# Patient Record
Sex: MALE | URBAN - METROPOLITAN AREA
[De-identification: names, ages, dates, MRNs, and addresses within clinical notes are randomized per-mention and may not be internally consistent; named-entity substitution may affect disease eponyms.]

---

## 2021-08-17 ENCOUNTER — OFFICE VISIT (OUTPATIENT)
Dept: URBAN - METROPOLITAN AREA CLINIC 75 | Facility: CLINIC | Age: 61
End: 2021-08-17
Payer: COMMERCIAL

## 2021-08-17 DIAGNOSIS — H35.371 PUCKERING OF MACULA, RIGHT EYE: ICD-10-CM

## 2021-08-17 DIAGNOSIS — H40.033 ANATOMICAL NARROW ANGLE, BILATERAL: Primary | ICD-10-CM

## 2021-08-17 DIAGNOSIS — H25.813 COMBINED FORMS OF AGE-RELATED CATARACT, BILATERAL: ICD-10-CM

## 2021-08-17 PROCEDURE — 92134 CPTRZ OPH DX IMG PST SGM RTA: CPT | Performed by: OPTOMETRIST

## 2021-08-17 PROCEDURE — 92133 CPTRZD OPH DX IMG PST SGM ON: CPT | Performed by: OPTOMETRIST

## 2021-08-17 PROCEDURE — 99204 OFFICE O/P NEW MOD 45 MIN: CPT | Performed by: OPTOMETRIST

## 2021-08-17 RX ORDER — LATANOPROST 50 UG/ML
0.005 % SOLUTION OPHTHALMIC
Qty: 7.5 | Refills: 3 | Status: INACTIVE
Start: 2021-08-17 | End: 2021-12-07

## 2021-08-17 ASSESSMENT — INTRAOCULAR PRESSURE
OS: 27
OD: 25

## 2021-08-17 NOTE — IMPRESSION/PLAN
Impression: Anatomical narrow angle, bilateral: H40.033. New DX 

OCT ordered and performed revealing RNLF cupping and elevated IOP. Angle-closure glaucoma, also known as narrow-angle glaucoma, is caused by blocked drainage canals in the eye, resulting in a sudden rise in intraocular pressure. Plan: Patient to begin Latanoprost QHS OU. Patient to IOP Target of 18.

## 2021-08-17 NOTE — IMPRESSION/PLAN
Impression: Puckering of macula, right eye: H35.371. Discussed epiretinal membranes do not usually require treatment, unless distorted vision or blurry vision occur. The main treatment consists of vitrectomy surgery with peeling off of the epiretinal membrane. Epiretinal membrane formation is often without symptoms or effect on vision. They occur from aging, previous eye trauma or surgery, or chronic eye inflammation (such as uveitis). In rare instances, they can progress and have a significant affect on vision. Plan: Discussed. Monitor. Pt to contact office w/ any loss or distortion of vision.

## 2021-08-17 NOTE — IMPRESSION/PLAN
Impression: Vitreous degeneration, bilateral: H43.813. New DX 

OCT ordered and performed revealing PVD OD causing slight pulling and tearing but stable at this time Posterior vitreous detachments are a normal aging change due to the vitreous jelly pulling away from the retinal lining of the eye. They may accompany retinal tears, retinal holes, or retinal detachments, so a dilated retinal examination is important. PVDs will usually diminish with time, but it may take several months and they rarely disappear entirely. Plan: Discussed. Monitor. Pt to contact office if symptoms worsen, including an increase in number of floaters, you experience flashing lights, loss of vision, or a black curtain blocking your field of vision.

## 2021-09-16 ENCOUNTER — OFFICE VISIT (OUTPATIENT)
Dept: URBAN - METROPOLITAN AREA CLINIC 75 | Facility: CLINIC | Age: 61
End: 2021-09-16
Payer: COMMERCIAL

## 2021-09-16 PROCEDURE — 99213 OFFICE O/P EST LOW 20 MIN: CPT | Performed by: OPTOMETRIST

## 2021-09-16 ASSESSMENT — INTRAOCULAR PRESSURE
OD: 20
OS: 22

## 2021-09-16 NOTE — IMPRESSION/PLAN
Impression: Combined forms of age-related cataract, bilateral: H25.813. Some specific cataracts like posterior subcapsular cataracts occur more commonly in diabetics, patients taking long term steroid medications, and following trauma. The appropriate time to perform cataract surgery is when the loss of vision is interfering with your activities of daily living and a change in eyeglass prescription won't help. Plan: Discussed cataracts do not require treatment unless they interfere with vision and impact one's activities of daily living, in which case cataract extraction with lens implant insertion should be performed. Pt declines any difficulty w/ vision at this time and elects to observe for now. No sx recommended. Discussed signs and symptoms of cataract progression. Pt to contact office if they experience progressive loss of vision, increasing glare, and problems with activities of daily living such as driving, reading, watching TV, seeing street signs, and following the golf ball.

## 2021-09-16 NOTE — IMPRESSION/PLAN
Impression: Anatomical narrow angle, bilateral: H40.033. IOP has dropped about %25 but still high normal OD and high OS. Plan: Discussed. Patient to continue Latanoprost QHS OU. If IOP does not continue to drop second morning GTTs to be considered. Monitor.

## 2021-12-07 ENCOUNTER — OFFICE VISIT (OUTPATIENT)
Dept: URBAN - METROPOLITAN AREA CLINIC 75 | Facility: CLINIC | Age: 61
End: 2021-12-07
Payer: COMMERCIAL

## 2021-12-07 DIAGNOSIS — H40.1122 PRIMARY OPEN-ANGLE GLAUCOMA, LEFT EYE, MODERATE STAGE: ICD-10-CM

## 2021-12-07 PROCEDURE — 92014 COMPRE OPH EXAM EST PT 1/>: CPT | Performed by: OPTOMETRIST

## 2021-12-07 PROCEDURE — 92083 EXTENDED VISUAL FIELD XM: CPT | Performed by: OPTOMETRIST

## 2021-12-07 RX ORDER — LATANOPROST 50 UG/ML
0.005 % SOLUTION OPHTHALMIC
Qty: 7.5 | Refills: 3 | Status: ACTIVE
Start: 2021-12-07

## 2021-12-07 ASSESSMENT — INTRAOCULAR PRESSURE
OD: 19
OS: 20

## 2021-12-07 NOTE — IMPRESSION/PLAN
Impression: Vitreous degeneration, bilateral: H43.813. Plan: Also known as posterior vitreous detachments are a normal aging change due to the vitreous jelly pulling away from the retinal lining of the eye. They may accompany retinal tears, retinal holes, or retinal detachments, so a dilated retinal examination is important. PVDs will usually diminish with time, but it may take several months and they rarely disappear entirely. Pt to contact office if symptoms worsen, including an increase in number of floaters, you experience flashing lights, loss of vision, or a black curtain blocking your field of vision.

## 2021-12-07 NOTE — IMPRESSION/PLAN
Impression: Primary open-angle glaucoma, severe stage, right eye: H40.1113- New findings on the on VF and clinical exam. 
IOP is elevated. Plan: *Will have pt see his PCP Dr. Justino Sotelo for full carotid artery work up and blood work. * (URGENT) PCP should order a sleep study as well. Will have pt back for full repeat exam.
Continue Latanoprost QPM OU. Will evaluate if an SLT is necessary at next visit.

## 2022-03-09 ENCOUNTER — OFFICE VISIT (OUTPATIENT)
Dept: URBAN - METROPOLITAN AREA CLINIC 75 | Facility: CLINIC | Age: 62
End: 2022-03-09
Payer: COMMERCIAL

## 2022-03-09 DIAGNOSIS — H40.1113 PRIMARY OPEN-ANGLE GLAUCOMA, RIGHT EYE, SEVERE STAGE: Primary | ICD-10-CM

## 2022-03-09 DIAGNOSIS — H43.813 VITREOUS DEGENERATION, BILATERAL: ICD-10-CM

## 2022-03-09 DIAGNOSIS — H25.13 AGE-RELATED NUCLEAR CATARACT, BILATERAL: ICD-10-CM

## 2022-03-09 DIAGNOSIS — H40.053 OCULAR HYPERTENSION, BILATERAL: ICD-10-CM

## 2022-03-09 DIAGNOSIS — H04.123 DRY EYE SYNDROME OF BILATERAL LACRIMAL GLANDS: ICD-10-CM

## 2022-03-09 PROCEDURE — 92083 EXTENDED VISUAL FIELD XM: CPT | Performed by: OPTOMETRIST

## 2022-03-09 PROCEDURE — 99214 OFFICE O/P EST MOD 30 MIN: CPT | Performed by: OPTOMETRIST

## 2022-03-09 ASSESSMENT — INTRAOCULAR PRESSURE
OS: 15
OD: 16

## 2022-03-09 ASSESSMENT — KERATOMETRY
OS: 46.13
OD: 45.00

## 2022-03-09 NOTE — IMPRESSION/PLAN
Impression: Ocular hypertension, bilateral: H40.053. OPTOS ordered and performed drance heme stable Ocular hypertension is the result of poor drainage of the aqueous humor (a fluid inside the eye). Essentially, this means that too much fluid enters the eye without being drained, causing high amounts of pressure to build up. An injury to the eye, certain diseases and some medications may raise eye pressure. Plan: Discussed diagnosis in detail with patient. Emphasized blood pressure/ cholesterol & sugar control. If signs or symptoms worsen Retina Consult to be considered.

## 2022-03-09 NOTE — IMPRESSION/PLAN
Impression: Primary open-angle glaucoma, right eye, severe stage: H40.1113. VF & OPTOS ordered and performed Correlating test revealing VF may have some disturbances, Plan: Glaucoma is usually a disease of high pressure in the eye that damages the optic nerve and causes loss of peripheral vision and possibly even blindness. Glaucoma treatment with various combinations of eye drops will usually help to lower the eye pressure and prevent further damage. In advanced or poorly controlled cases, laser treatment of conventional glaucoma surgery may be necessary. Continue Latanoprost QHS OU. Contact office if drops are causing pain, irritation, or blurry vision after use.

## 2022-06-13 ENCOUNTER — OFFICE VISIT (OUTPATIENT)
Dept: URBAN - METROPOLITAN AREA CLINIC 75 | Facility: CLINIC | Age: 62
End: 2022-06-13
Payer: COMMERCIAL

## 2022-06-13 DIAGNOSIS — H04.123 DRY EYE SYNDROME OF BILATERAL LACRIMAL GLANDS: ICD-10-CM

## 2022-06-13 DIAGNOSIS — H40.1132 PRIMARY OPEN-ANGLE GLAUCOMA, BILATERAL, MODERATE STAGE: Primary | ICD-10-CM

## 2022-06-13 DIAGNOSIS — H25.813 COMBINED FORMS OF AGE-RELATED CATARACT, BILATERAL: ICD-10-CM

## 2022-06-13 DIAGNOSIS — H43.813 VITREOUS DEGENERATION, BILATERAL: ICD-10-CM

## 2022-06-13 PROCEDURE — 99214 OFFICE O/P EST MOD 30 MIN: CPT | Performed by: OPTOMETRIST

## 2022-06-13 PROCEDURE — 92134 CPTRZ OPH DX IMG PST SGM RTA: CPT | Performed by: OPTOMETRIST

## 2022-06-13 PROCEDURE — 92133 CPTRZD OPH DX IMG PST SGM ON: CPT | Performed by: OPTOMETRIST

## 2022-06-13 ASSESSMENT — INTRAOCULAR PRESSURE
OD: 14
OS: 18

## 2022-06-13 NOTE — IMPRESSION/PLAN
Impression: Dry eye syndrome of bilateral lacrimal glands: H04.123. Plan: Dry eye syndrome requires lubrication of the eye with artificial tears and nighttime ointments or gels. In addition, topical and oral anti-inflammatory medications are usually necessary to treat the associated ocular irritation. Recommend 3-4 drops daily of OTC drops such as Systane or Refresh. Refresh sample given in office. Educated on Preservative Free drops when used more than 4x a day. Contact office if dry eye does not improve, worsens or blurs vision.

## 2022-06-13 NOTE — IMPRESSION/PLAN
Impression: Combined forms of age-related cataract, bilateral: H25.813. Plan: Discussed cataracts do not require treatment unless they interfere with vision and impact one's activities of daily living, in which case cataract extraction with lens implant insertion should be performed. Pt declines any difficulty w/ vision at this time and elects to observe for now. No surgical intervention recommended. Contact office if you experience progressive loss of vision, increasing glare, and problems with daily activities.

## 2022-06-13 NOTE — IMPRESSION/PLAN
Impression: Primary open-angle glaucoma, bilateral, moderate stage: H55.2630. OCT ordered and performed Plan: Glaucoma is usually a disease of high pressure in the eye that damages the optic nerve and causes loss of peripheral vision and possibly even blindness. Glaucoma treatment with various combinations of eye drops will usually help to lower the eye pressure and prevent further damage. In advanced or poorly controlled cases, laser treatment of conventional glaucoma surgery may be necessary. Continue Latanoprost QHS OU. Contact office with any changes in vision or concerns.

## 2022-12-14 ENCOUNTER — OFFICE VISIT (OUTPATIENT)
Dept: URBAN - METROPOLITAN AREA CLINIC 75 | Facility: CLINIC | Age: 62
End: 2022-12-14
Payer: COMMERCIAL

## 2022-12-14 DIAGNOSIS — H40.1132 PRIMARY OPEN-ANGLE GLAUCOMA, BILATERAL, MODERATE STAGE: Primary | ICD-10-CM

## 2022-12-14 DIAGNOSIS — H25.813 COMBINED FORMS OF AGE-RELATED CATARACT, BILATERAL: ICD-10-CM

## 2022-12-14 DIAGNOSIS — H47.021 BLEEDING IN OPTIC NERVE SHEATH OF RIGHT EYE: ICD-10-CM

## 2022-12-14 DIAGNOSIS — H43.813 VITREOUS DEGENERATION, BILATERAL: ICD-10-CM

## 2022-12-14 DIAGNOSIS — H35.61 RETINAL HEMORRHAGE, RIGHT EYE: ICD-10-CM

## 2022-12-14 PROCEDURE — 99214 OFFICE O/P EST MOD 30 MIN: CPT | Performed by: OPTOMETRIST

## 2022-12-14 PROCEDURE — 92083 EXTENDED VISUAL FIELD XM: CPT | Performed by: OPTOMETRIST

## 2022-12-14 ASSESSMENT — INTRAOCULAR PRESSURE
OS: 16
OD: 16

## 2022-12-14 NOTE — IMPRESSION/PLAN
Impression: Primary open-angle glaucoma, bilateral, moderate stage: Y21.1895. VF & Optos ordered and performed Plan: Glaucoma is usually a disease of high pressure in the eye that damages the optic nerve and causes loss of peripheral vision and possibly even blindness. Glaucoma treatment with various combinations of eye drops will usually help to lower the eye pressure and prevent further damage. In advanced or poorly controlled cases, laser treatment of conventional glaucoma surgery may be necessary. Continue Latanoprost QHS OU. Contact office with any changes in vision or concerns.

## 2022-12-14 NOTE — IMPRESSION/PLAN
Impression: Retinal hemorrhage, right eye: H35.61. New DX Optos ordered and performed revealing 3 dot hemes sup above nerve Plan: There are many different causes for retinal hemorrhage in the eye. This may be a mechanical effect from the vitreous or jelly: in the eye or may be secondary to a systemic disease. There is no specific treatment from an eye standpoint for retinal hemorrhage. Will follow up in 3m Contact the office if you experience decrease in vision, change in vision, pain, redness, flashes/floaters, black shadow or curtain or other eye concerns you may have.

## 2022-12-14 NOTE — IMPRESSION/PLAN
Called [employer] of [phone] and spoke to Elis anton # [phone], 3 days.   Impression: Bleeding in optic nerve sheath of right eye: H47.021. HX HX of 2 hemes - resolved Plan: Discussed. Observe.

## 2023-03-08 ENCOUNTER — OFFICE VISIT (OUTPATIENT)
Dept: URBAN - METROPOLITAN AREA CLINIC 75 | Facility: CLINIC | Age: 63
End: 2023-03-08
Payer: COMMERCIAL

## 2023-03-08 DIAGNOSIS — H40.1132 PRIMARY OPEN-ANGLE GLAUCOMA, BILATERAL, MODERATE STAGE: ICD-10-CM

## 2023-03-08 DIAGNOSIS — H35.61 RETINAL HEMORRHAGE, RIGHT EYE: Primary | ICD-10-CM

## 2023-03-08 PROCEDURE — 99214 OFFICE O/P EST MOD 30 MIN: CPT | Performed by: OPTOMETRIST

## 2023-03-08 ASSESSMENT — INTRAOCULAR PRESSURE
OS: 17
OD: 16

## 2023-03-08 NOTE — IMPRESSION/PLAN
Impression: Primary open-angle glaucoma, bilateral, moderate stage: D76.3556. Optos ordered and performed Plan: Glaucoma is usually a disease of high pressure in the eye that damages the optic nerve and causes loss of peripheral vision and possibly even blindness. Glaucoma treatment with various combinations of eye drops will usually help to lower the eye pressure and prevent further damage. In advanced or poorly controlled cases, laser treatment of conventional glaucoma surgery may be necessary. Continue Latanoprost QHS OU. Contact office with any changes in vision or concerns.

## 2023-03-08 NOTE — IMPRESSION/PLAN
Impression: Retinal hemorrhage, right eye: H35.61. Optos ordered and performed revealing progression of dot hemes sup above nerve  Plan: Discussed testing in detail. Pt st recently seen w/blood panels taken w/o concerns on DM at this time per pt. Educated pt possibility of other DX concerns, pt to bring papers in to review to rule out concerns. CBC with differential, ESR, and CRP with potential Zonia panel due to positive family HX to be ordered pending review of blood work. If hemes continue to progress will have pt consult retina Contact the office if you experience decrease in vision, change in vision, pain, redness, flashes/floaters, black shadow or curtain or other eye concerns you may have.

## 2023-04-05 ENCOUNTER — OFFICE VISIT (OUTPATIENT)
Dept: URBAN - METROPOLITAN AREA CLINIC 75 | Facility: CLINIC | Age: 63
End: 2023-04-05
Payer: COMMERCIAL

## 2023-04-05 DIAGNOSIS — H35.61 RETINAL HEMORRHAGE, RIGHT EYE: ICD-10-CM

## 2023-04-05 DIAGNOSIS — H33.8 OTHER RETINAL DETACHMENTS: Primary | ICD-10-CM

## 2023-04-05 DIAGNOSIS — H10.45 OTHER CHRONIC ALLERGIC CONJUNCTIVITIS: ICD-10-CM

## 2023-04-05 PROCEDURE — 99215 OFFICE O/P EST HI 40 MIN: CPT | Performed by: OPTOMETRIST

## 2023-04-05 ASSESSMENT — INTRAOCULAR PRESSURE
OD: 20
OS: 16

## 2023-04-05 NOTE — IMPRESSION/PLAN
Impression: Other chronic allergic conjunctivitis: H10.45.  Plan: Discussed and recommended Ointment and OTC Allergy gtts to relive and maintain symptoms

## 2023-04-05 NOTE — IMPRESSION/PLAN
Impression: Other retinal detachments: H33.8. RD until proven otherwise - poor view through vitreous heme Plan: Discussed in detail. Pt to consult retina n/a urgent to determine RD. Educated pt on risks and benefits of surgical interventions.  
Recommended B scan

## 2023-06-07 ENCOUNTER — OFFICE VISIT (OUTPATIENT)
Dept: URBAN - METROPOLITAN AREA CLINIC 75 | Facility: CLINIC | Age: 63
End: 2023-06-07
Payer: COMMERCIAL

## 2023-06-07 DIAGNOSIS — H35.61 RETINAL HEMORRHAGE, RIGHT EYE: Primary | ICD-10-CM

## 2023-06-07 DIAGNOSIS — H17.89 OTHER CORNEAL SCAR: ICD-10-CM

## 2023-06-07 DIAGNOSIS — H40.1132 PRIMARY OPEN-ANGLE GLAUCOMA, BILATERAL, MODERATE STAGE: ICD-10-CM

## 2023-06-07 DIAGNOSIS — H43.813 VITREOUS DEGENERATION, BILATERAL: ICD-10-CM

## 2023-06-07 DIAGNOSIS — H25.13 AGE-RELATED NUCLEAR CATARACT, BILATERAL: ICD-10-CM

## 2023-06-07 PROCEDURE — 99213 OFFICE O/P EST LOW 20 MIN: CPT | Performed by: OPTOMETRIST

## 2023-06-07 ASSESSMENT — INTRAOCULAR PRESSURE
OD: 19
OS: 20

## 2023-06-07 NOTE — IMPRESSION/PLAN
Impression: Primary open-angle glaucoma, bilateral, moderate stage: I77.1164. Optos ordered and performed Plan: 
Continue Latanoprost QHS OU. Contact office with any changes in vision or concerns.

## 2023-06-07 NOTE — IMPRESSION/PLAN
Impression: Retinal hemorrhage, right eye: H35.61. OPTOS ordered and performed
vitreous heme resolved some since las t exam -still present Plan: There are many different causes for retinal hemorrhage in the eye. Emphasized importance of controlling BP. Cholesterol, and sugars for DM. No treatment recommended at this time. Contact the office if you experience decrease in vision, change in vision, pain, redness, flashes/floaters, black shadow or curtain or other eye concerns you may have.

## 2024-04-18 ENCOUNTER — OFFICE VISIT (OUTPATIENT)
Dept: URBAN - METROPOLITAN AREA CLINIC 75 | Facility: CLINIC | Age: 64
End: 2024-04-18
Payer: COMMERCIAL

## 2024-04-18 DIAGNOSIS — H25.813 COMBINED FORMS OF AGE-RELATED CATARACT, BILATERAL: ICD-10-CM

## 2024-04-18 DIAGNOSIS — I78.1 NEVUS, NON-NEOPLASTIC: ICD-10-CM

## 2024-04-18 DIAGNOSIS — H40.1132 PRIMARY OPEN-ANGLE GLAUCOMA, BILATERAL, MODERATE STAGE: Primary | ICD-10-CM

## 2024-04-18 PROCEDURE — 99214 OFFICE O/P EST MOD 30 MIN: CPT | Performed by: OPTOMETRIST

## 2024-04-18 PROCEDURE — 92133 CPTRZD OPH DX IMG PST SGM ON: CPT | Performed by: OPTOMETRIST

## 2024-04-18 ASSESSMENT — INTRAOCULAR PRESSURE
OD: 17
OS: 16

## 2024-10-21 ENCOUNTER — OFFICE VISIT (OUTPATIENT)
Dept: URBAN - METROPOLITAN AREA CLINIC 75 | Facility: CLINIC | Age: 64
End: 2024-10-21
Payer: COMMERCIAL

## 2024-10-21 DIAGNOSIS — I78.1 NEVUS, NON-NEOPLASTIC: ICD-10-CM

## 2024-10-21 DIAGNOSIS — H25.813 COMBINED FORMS OF AGE-RELATED CATARACT, BILATERAL: ICD-10-CM

## 2024-10-21 DIAGNOSIS — H40.1132 PRIMARY OPEN-ANGLE GLAUCOMA, BILATERAL, MODERATE STAGE: Primary | ICD-10-CM

## 2024-10-21 DIAGNOSIS — H47.233 GLAUCOMATOUS OPTIC ATROPHY, BILATERAL: ICD-10-CM

## 2024-10-21 PROCEDURE — 92133 CPTRZD OPH DX IMG PST SGM ON: CPT | Performed by: OPTOMETRIST

## 2024-10-21 PROCEDURE — 99214 OFFICE O/P EST MOD 30 MIN: CPT | Performed by: OPTOMETRIST

## 2024-10-21 PROCEDURE — 92134 CPTRZ OPH DX IMG PST SGM RTA: CPT | Performed by: OPTOMETRIST

## 2024-10-21 ASSESSMENT — INTRAOCULAR PRESSURE
OD: 14
OS: 17

## 2025-04-22 ENCOUNTER — OFFICE VISIT (OUTPATIENT)
Dept: URBAN - METROPOLITAN AREA CLINIC 75 | Facility: CLINIC | Age: 65
End: 2025-04-22
Payer: COMMERCIAL

## 2025-04-22 DIAGNOSIS — H31.091 OTHER CHORIORETINAL SCARS, RIGHT EYE: ICD-10-CM

## 2025-04-22 DIAGNOSIS — H40.1132 PRIMARY OPEN-ANGLE GLAUCOMA, BILATERAL, MODERATE STAGE: Primary | ICD-10-CM

## 2025-04-22 DIAGNOSIS — H25.813 COMBINED FORMS OF AGE-RELATED CATARACT, BILATERAL: ICD-10-CM

## 2025-04-22 DIAGNOSIS — H47.233 GLAUCOMATOUS OPTIC ATROPHY, BILATERAL: ICD-10-CM

## 2025-04-22 DIAGNOSIS — I78.1 NEVUS, NON-NEOPLASTIC: ICD-10-CM

## 2025-04-22 PROCEDURE — 92273 FULL FIELD ERG W/I&R: CPT | Performed by: OPTOMETRIST

## 2025-04-22 PROCEDURE — 99214 OFFICE O/P EST MOD 30 MIN: CPT | Performed by: OPTOMETRIST

## 2025-04-22 PROCEDURE — 92083 EXTENDED VISUAL FIELD XM: CPT | Performed by: OPTOMETRIST

## 2025-04-22 ASSESSMENT — INTRAOCULAR PRESSURE
OD: 18
OD: 21
OS: 18
OS: 16